# Patient Record
Sex: FEMALE | Race: ASIAN | Employment: STUDENT | URBAN - METROPOLITAN AREA
[De-identification: names, ages, dates, MRNs, and addresses within clinical notes are randomized per-mention and may not be internally consistent; named-entity substitution may affect disease eponyms.]

---
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
## 2025-04-18 NOTE — PROGRESS NOTES
The following individual(s) verbally consented to be recorded using ambient AI listening technology and understand that they can each withdraw their consent to this listening technology at any point by asking the clinician to turn off or pause the recording:    Patient name: Devorah Munoz  Additional names:  luisa multani

## 2025-04-22 NOTE — TELEPHONE ENCOUNTER
Breztri not covered by patient's insurance.  Patient must try and fail 5 other medications including Spiriva in order to obtain a prior authorization.  Patient currently on Symbicort.  Per Dr. Adkins patient to use Spiriva.

## 2025-04-23 NOTE — TELEPHONE ENCOUNTER
Patients insurance will not cover alternative inhaler (Spiriva), she needs another alternative sent to [facility].

## 2025-04-23 NOTE — TELEPHONE ENCOUNTER
Call made to [facility] pharmacy. Per Pharmacist [facility] is not a covered pharmacy.  Script must be sent to a [facility]. Patient notified and Spiriva sent to [facility] on [address].

## 2025-04-23 NOTE — TELEPHONE ENCOUNTER
[facility] on [facility] called, spoke with pharmacy technician,  requested prescription for Spiriva be transferred to [facility] on [facility]. Per tech, patient can called that location and ask to have the prescription pulled and transferred. Patient called to notify. Advised to continue Symbicort as well.

## 2025-04-23 NOTE — TELEPHONE ENCOUNTER
Patient needs medication sent to the [facility] on [address] [place] [address].     She may need someone else to pick this up for her. Is there anything she needs to do?

## 2025-04-28 NOTE — PROGRESS NOTES
[facility] General Pulmonary Consult Note    Chief Complaint:  Chief Complaint   Patient presents with    New Patient     Pt c/o dyspnea on exertion x6 years        History of Present Illness:  History of Present Illness  Devorah Munoz is a 20-year-old female who presents with concerns about asthma and shortness of breath during exercise.    She experiences significant shortness of breath after running even short distances, a persistent issue since her freshman year of high school. She has difficulty breathing during cheerleading activities, which involve running, tumbling, and stunting. She describes a 'gasp for air' and a subtle whistling sound when breathing after exertion.    She has a history of seeing an asthma specialist who prescribed medications, but she did not have a follow-up. She underwent a breathing test and a peak flow test previously, but she is unsure of the results. She has not been on any oral steroids like prednisone.    Currently, she uses Symbicort, which she finds helpful in maintaining her breathing throughout the day, especially when climbing stairs. She uses albuterol only in 'desperate needs' due to concerns about shakiness. Her symptoms are bothersome on a daily basis, including at night.    She has a history of smoking but is trying to quit. Her breathing issues worsen during cold and hot seasons, requiring her to cover her face in winter to ease breathing.      Past Medical History:   Past Medical History[1]     Past Surgical History: Past Surgical History[2]    Family Medical History: Family History[3]     Social History:   Social History     Socioeconomic History    Marital status: Single     Spouse name: Not on file    Number of children: Not on file    Years of education: Not on file    Highest education level: Not on file   Occupational History    Not on file   Tobacco Use    Smoking status: Former     Types: Cigarettes     Passive exposure: Past    Smokeless tobacco: Never    Tobacco  comments:     Social smoker    Vaping Use    Vaping status: Every Day   Substance and Sexual Activity    Alcohol use: Not Currently     Comment: occasion, history of binge drinking    Drug use: Not Currently     Types: Cannabis     Comment: daily    Sexual activity: Not on file   Other Topics Concern    Not on file   Social History Narrative    Not on file     Social Drivers of Health     Food Insecurity: No Food Insecurity (9/26/2023)    Received from [facility]    Food Insecurity     RETIRE How often in the past 12 months would you say you are worried or stressed about having enough money to buy nutritious meals? : Never   Transportation Needs: No Transportation Needs (9/26/2023)    Received from [facility]    PRAPARE - Transportation     In the past 12 months, has lack of transportation kept you from medical appointments or from getting medications?: No     In the past 12 months, has lack of transportation kept you from meetings, work, or from getting things needed for daily living?: No   Stress: Not on file   Housing Stability: At Risk (8/18/2023)    Received from [facility]     Living Situation: Not on file     Housing Problems: Not on file        Allergies: Oxcarbazepine, Seasonal, Lactose, and Wheat gluten     Medications: Current Medications[4]    Review of Systems: Review of Systems    Physical Exam:  /64 (BP Location: Right arm, Patient Position: Sitting, Cuff Size: adult)   Pulse 57   Resp 16   Ht 5' (1.524 m)   Wt 120 lb (54.4 kg)   SpO2 100%   BMI 23.44 kg/m²      Constitutional: alert, cooperative. No acute distress.  HEENT: Head NC/AT. Nares normal. Septum midline. Mucosa normal. No drainage or sinus tenderness.. Mallampati 1+  Cardio: Regular rate and rhythm. Normal S1 and S2. No murmurs.   Respiratory: Thorax symmetrical with no labored breathing. clear to auscultation bilaterally  GI: NABS. Abd soft, non-tender.  Extremities: No clubbing or  cyanosis. No BLE edema.    Neurologic: A&Ox3. No gross motor deficits.  Skin: Warm, dry  Psych: Calm, cooperative. Pleasant affect.    Results:  Images personally reviewed - my own review dictated as below  Results      WBC: 6, done on 4/18/2025.  HGB: 12.7, done on 4/18/2025.  PLT: 363, done on 4/18/2025.     Last eGFR was 80 on 10/17/2023.  CA: 10.1, done on 10/17/2023.  Cl: 108, done on 10/17/2023.  CO2: 24, done on 10/17/2023.     No results found.     Assessment/Plan:  Assessment & Plan  Exercise-induced bronchoconstriction  Symptoms of shortness of breath and wheezing post-exertion suggest exercise-induced bronchoconstriction. Breztri recommended for improved control over Symbicort.  - Prescribe Breztri inhaler, use twice daily.  - Discontinue Symbicort.  - Continue Albuterol as needed for acute symptoms.  - Order pulmonary function test to compare with December 2021 results.  - Order blood work to identify potential triggers and guide treatment.  - Instruct to rinse mouth after Breztri to prevent thrush.    Asthma  Asthma-like symptoms noted, with consideration of different types and triggers. No recent exacerbations requiring oral steroids.  - Evaluate blood work to determine asthma type and potential triggers.  - Monitor response to Breztri inhaler.    Addendum: breztri not covered will try symbicort and incruse    Return in about 6 weeks (around 5/30/2025).    Jhonny Adkins MD  4/28/2025         [1]   Past Medical History:   ADHD (attention deficit hyperactivity disorder)    Asthma (HCC)    Borderline personality disorder (HCC)    Depression    Eating disorder, unspecified    Headache disorder    migraines    Pain    IN BOTH LEGS \"GROWING PAINS\"    Personal history of nonsuicidal self-injury   [2]   Past Surgical History:  Procedure Laterality Date    Remove tonsils/adenoids,<11 y/o     [3]   Family History  Family history unknown: Yes   [4]   Current Outpatient Medications   Medication Sig  Dispense Refill    umeclidinium bromide (INCRUSE ELLIPTA) 62.5 MCG/ACT Inhalation Aerosol Powder, Breath Activated Inhale 1 puff into the lungs daily. 30 each 2    albuterol 108 (90 Base) MCG/ACT Inhalation Aero Soln       B Complex Oral Cap Take 1 capsule by mouth.      SYMBICORT 160-4.5 MCG/ACT Inhalation Aerosol       budeson-glycopyrrol-formoterol (BREZTRI AEROSPHERE) 160-9-4.8 MCG/ACT Inhalation Aerosol Inhale 2 puffs into the lungs 2 (two) times daily. 10.7 g 5    tretinoin 0.025 % External Cream Apply to face every day in the evening      Cariprazine HCl (VRAYLAR) 4.5 MG Oral Cap Take 1 capsule by mouth at bedtime.      cloNIDine 0.2 MG Oral Tab Take 1 tablet (0.2 mg total) by mouth at bedtime.      SPIRIVA RESPIMAT 2.5 MCG/ACT Inhalation Aero Soln Inhale 2 puffs into the lungs daily. 4 g 3

## 2025-05-15 NOTE — PROCEDURES
Findings:  FEV1 is 2.18L, z-score of -1.34.  FVC is 2.81L, z-score of -0.35.  FEV1/ FVC ratio is 0.77.  The flow-volume loop demonstrates a normal pattern.  The TLC is 3.98L, z-score of -0.48.  The residual volume 1.17L, z-score of 0.62.  The diffusion capacity is 13.12 with z-score of -2.80. When corrected for alveolar volume, the diffusion capacity is 3.63 with z-score of -2.06.  Impression:  There is no airway obstruction on spirometry and visualized on flow-volume loop.  Lung volumes are normal.  Diffusion capacity is moderately reduced but improves to mild when considering alveolar volume. The isolated decrease in diffusion capacity can be seen in emphysema, interstitial lung disease, pulmonary vascular disease (such as pulmonary hypertension) and anemia. If not already performed, would suggest further evaluation as determined clinically.  Additionally, given the severity of the reduced diffusion capacity, would recommend evaluation for hypoxia and supplemental oxygenation if not already performed.  When compared to previous spirometry testing dated 12/15/2021, there have been increases in FEV1 (previously 1.86L, 73% predicted) and FVC (previously 2.25L, 82% predicted).  Of note, this testing was performed in accordance to ATS/ERS interpretation guidelines with the use of upper and lower limits of normal as well as z-score references. Previous testing (before March 2024) was not performed at [facility] using z-scores and so comparison to previous testing should be taken with caution.

## 2025-06-20 NOTE — PROGRESS NOTES
The following individual(s) verbally consented to be recorded using ambient AI listening technology and understand that they can each withdraw their consent to this listening technology at any point by asking the clinician to turn off or pause the recording:    Patient name: Devorah Dick Alexander  Additional names:  Inga Munzo

## 2025-06-21 NOTE — PROGRESS NOTES
[facility] Pulmonary Follow Up Note    Chief Complaint:  Chief Complaint   Patient presents with    Follow - Up     Pt is here for f/u. Pt states been using inhaler more, otherwise no c/o.       History of Present Illness:  History of Present Illness  Devorah Munoz is a 20 year old female with asthma who presents with shortness of breath and iron deficiency anemia.    She experiences shortness of breath and a sensation of chest pressure, described as 'hard to breathe' and 'heavy,' without wheezing. Her breathing improves with inhalers, including a new one called spiriva, but she still frequently uses albuterol.  I have been treating her for asthma and uptitrating inhalers, which seems to have helped somewhat.    She has irregular and heavy menstrual periods. Her hemoglobin level is 12.4 g/dL and ferritin level is 29 ng/mL. She takes one iron pill daily but experiences constipation as a side effect. Despite consuming red meat and other iron-rich foods, her iron levels remain low. She also experiences dizziness, possibly related to her iron deficiency.         Past Medical History:   Past Medical History[1]     Past Surgical History:   Past Surgical History[2]    Family Medical History: Family History[3]     Social History:   Social History     Socioeconomic History    Marital status: Single     Spouse name: Not on file    Number of children: Not on file    Years of education: Not on file    Highest education level: Not on file   Occupational History    Not on file   Tobacco Use    Smoking status: Former     Current packs/day: 0.00     Average packs/day: 0.5 packs/day for 6.3 years (3.2 ttl pk-yrs)     Types: Cigarettes     Start date:      Quit date: 2025     Years since quittin.1     Passive exposure: Past    Smokeless tobacco: Never    Tobacco comments:     Social smoker    Vaping Use    Vaping status: Every Day   Substance and Sexual Activity    Alcohol use: Not Currently     Comment: occasion, history of  binge drinking    Drug use: Not Currently     Types: Cannabis     Comment: daily    Sexual activity: Not on file   Other Topics Concern    Not on file   Social History Narrative    Not on file     Social Drivers of Health     Food Insecurity: No Food Insecurity (9/26/2023)    Received from [facility]    Food Insecurity     RETIRE How often in the past 12 months would you say you are worried or stressed about having enough money to buy nutritious meals? : Never   Transportation Needs: No Transportation Needs (9/26/2023)    Received from [facility]    PRAPARE - Transportation     In the past 12 months, has lack of transportation kept you from medical appointments or from getting medications?: No     In the past 12 months, has lack of transportation kept you from meetings, work, or from getting things needed for daily living?: No   Stress: Not on file   Housing Stability: At Risk (8/18/2023)    Received from [facility]     Living Situation: Not on file     Housing Problems: Not on file        Medications: Current Medications[4]    Review of Systems: Review of Systems     Physical Exam:  /68 (BP Location: Right arm, Patient Position: Sitting, Cuff Size: adult)   Pulse 72   Ht 5' (1.524 m)   Wt 120 lb (54.4 kg)   SpO2 100%   BMI 23.44 kg/m²      Constitutional: alert, cooperative. No acute distress.  HEENT: Head NC/AT. Nares normal. Septum midline. Mucosa normal. No drainage or sinus tenderness.  Cardio: Regular rate and rhythm. Normal S1 and S2. No murmurs.   Respiratory: Thorax symmetrical with no labored breathing. clear to auscultation bilaterally  Extremities: No clubbing or cyanosis. No BLE edema.    Neurologic: A&Ox3. No gross motor deficits.  Skin: Warm, dry  Psych: Calm, cooperative. Pleasant affect.    Results:  Images personally reviewed - reading is my own personal review  Results  LABS  Hb: 12.4 g/dL  Ferritin: 29 ng/mL    DIAGNOSTIC  Pulmonary function  test: Mild decrease in diffusing capacity for carbon monoxide (DLCO)       PFTs:       No data to display                   No data to display                    WBC: 6, done on 4/18/2025.  HGB: 12.7, done on 4/18/2025.  PLT: 363, done on 4/18/2025.     Last eGFR was 80 on 10/17/2023.  CA: 10.1, done on 10/17/2023.  Cl: 108, done on 10/17/2023.  CO2: 24, done on 10/17/2023.     No results found.     Assessment/Plan:  Assessment & Plan  Iron deficiency anemia  Iron deficiency anemia likely due to menorrhagia. Hemoglobin 12.4 g/dL, ferritin 29 ng/mL indicate low iron stores. Symptoms include dyspnea and dizziness. IV iron preferred due to previous constipation and inadequate oral iron repletion.  - Refer to hematologist for evaluation and potential IV iron therapy.  - Discuss dietary options to increase iron intake, including red meat and beans.  - Consider oral iron supplementation if dietary changes are insufficient.    Asthma  Asthma diagnosis uncertain. Breathing improved with inhalers, but dyspnea persists, possibly related to anemia. Mild decrease in oxygen transport on pulmonary function test, likely due to anemia.  - Continue current inhaler regimen.  - Reassess asthma symptoms after addressing iron deficiency.  - Consider methacholine challenge test if asthma diagnosis remains uncertain after iron repletion.         No follow-ups on file.    I spent 30 minutes obtaining and reviewing records, preparing for the visit including reviewing chart and prior testing, face to face time examining the patient and obtaining history, counseling, arranging and reviewing office-based testing, independently reviewing relevant studies and documentation exclusive of other billable procedures.      Jhonny Adkins MD  6/20/2025         [1]   Past Medical History:   ADHD (attention deficit hyperactivity disorder)    Asthma (HCC)    Borderline personality disorder (HCC)    Depression    Eating disorder, unspecified     Headache disorder    migraines    Pain    IN BOTH LEGS \"GROWING PAINS\"    Personal history of nonsuicidal self-injury   [2]   Past Surgical History:  Procedure Laterality Date    Remove tonsils/adenoids,<11 y/o     [3]   Family History  Family history unknown: Yes   [4]   Current Outpatient Medications   Medication Sig Dispense Refill    SPIRIVA RESPIMAT 2.5 MCG/ACT Inhalation Aero Soln Inhale 2 puffs into the lungs daily. 4 g 3    Budesonide-Formoterol Fumarate (SYMBICORT) 160-4.5 MCG/ACT Inhalation Aerosol Inhale 2 puffs into the lungs 2 (two) times daily. 10.2 g 5    albuterol 108 (90 Base) MCG/ACT Inhalation Aero Soln       B Complex Oral Cap Take 1 capsule by mouth.      SYMBICORT 160-4.5 MCG/ACT Inhalation Aerosol Inhale 2 puffs into the lungs 2 (two) times daily.      tretinoin 0.025 % External Cream Apply to face every day in the evening      umeclidinium bromide (INCRUSE ELLIPTA) 62.5 MCG/ACT Inhalation Aerosol Powder, Breath Activated Inhale 1 puff into the lungs daily. (Patient not taking: Reported on 6/20/2025) 30 each 2    budeson-glycopyrrol-formoterol (BREZTRI AEROSPHERE) 160-9-4.8 MCG/ACT Inhalation Aerosol Inhale 2 puffs into the lungs 2 (two) times daily. (Patient not taking: Reported on 6/20/2025) 10.7 g 5    Cariprazine HCl (VRAYLAR) 4.5 MG Oral Cap Take 1 capsule by mouth at bedtime. (Patient not taking: Reported on 6/20/2025)      cloNIDine 0.2 MG Oral Tab Take 1 tablet (0.2 mg total) by mouth at bedtime. (Patient not taking: Reported on 6/20/2025)

## 2025-07-07 NOTE — TELEPHONE ENCOUNTER
Patient advised by Dr. Adkins to see a Hematologist. She called to schedule but they will not schedule her unless they receive a referral from our office with her diagnosis (anemia).     [facility] fax number: [phone]

## 2025-07-07 NOTE — TELEPHONE ENCOUNTER
Patient called. Discussed with Cherelle ORR. Agreeable to have hematology referral to [facility]--Dr Ernestina Lynne. Referral electronically routed to Dr. Lynne.  [phone]. Patient notified via [facility] message.

## (undated) NOTE — MR AVS SNAPSHOT
After Visit Summary   10/4/2017    Timmy Valentino    MRN: [id number]           Visit Information     Date & Time  10/4/2017  7:30 AM Provider  [address] [place] [address] EEG Dept.  Phone  [phone]      Allergies as of 10/4/2017  Reviewed o o go on a walking scavenger hunt through the neighborhood   o grow a family garden    In addition to 11, 4, 3, 2, 1 families can make small changes in their family routines to help everyone lead healthier active lives.  Try:  o Eating breakfast everyday  o E few hours.            Treatment for mild   illness or injury that does   not require immediate attention   VIDEO VISITS  Average cost  $35*    e-VISITS  Average cost  $35*      [address]

## (undated) NOTE — IP AVS SNAPSHOT
[address]            (For Outpatient Use Only) Initial Admit Date: 2022   Inpt/Obs Admit Date: Inpt: 22 / Obs: N/A   Discharge Date:    Hospital Acct:  [de-identified]   MRN: [de-identified]   CSN: [id number]   CEID: [id number]        ENCOUNTER  Patient Class: Inpatient Admitting Provider: Nancy Novak DO Unit: [facility] [address]   Hospital Service: Pediatrics Attending Provider: Gabriella David MD   Bed: 186-A   Visit Type:   Referring Physician: No ref. provider found Billing Flag:    Admit Diagnosis: Anxiety [F41.9]      PATIENT  Legal Name:   Alina Diallo    Legal Sex: Female  Gender ID: Other             Pref Name:    PCP:  Xu Borrero MD Home: [phone]   Address:  [place] [address] : 2005 (17 yrs) Mobile: [phone]         City/State/Zip: [facility] [address] Marital: Single Language: [address] [place] [address] Drive: [employer] SSN4: [id number] Christian: Wishes Privacy     Race:  Ethnicity: Non  Or [address] CONTACT   Name Relationship Legal Guardian? Home Phone Work Phone Mobile Phone   1.  Inga Munoz  2. *No Contact Specified* Mother            [phone]       GUARANTOR  Guarantor: Manuel PÉREZ : 10/20/1967 Home Phone: [phone]   Address: [place] [address]  Sex: Female Work Phone:    City/[facility]/Zip: [facility]   Rel. to Patient: Mother Guarantor ID: [id number]   [address]   Employer: [employer] Status: FULL TIME     COVERAGE  PRIMARY INSURANCE   Payor[place] Plan: BCBS PPO   Group Number: [id number] Insurance Type: [address] Name: Kris Max : 10/20/1967   Subscriber ID: [id number] Pt Rel to Subscriber: Child   SECONDARY INSURANCE   Payor: MEDICAID Plan: MEDICAID   Group Number:  Insurance Type: INDEMNITY   Subscriber Name: Shauna Meghan : 2005   Subscriber ID: [id number] Pt Rel to Subscriber: SELF   TERTIARY INSURANCE   Payor:  Plan:    Group Number:  Insurance Type:    Subscriber Name:  Julián Law :    Subscriber ID:  Pt Rel to Subscriber:    Hospital Account Financial Class: Tresa Gitelman [facility]    2022

## (undated) NOTE — ED AVS SNAPSHOT
Timmy Chelsy   MRN: [id number]    Department:  [facility] Emergency Department   Date of Visit:  7/13/2018           Disclosure     Insurance plans vary and the physician(s) referred by the ER may not be covered by your plan.  Please contact your tell this physician (or your personal doctor if your instructions are to return to your personal doctor) about any new or lasting problems. The primary care or specialist physician will see patients referred from the [facility] Emergency Department.  Brook Carpio

## (undated) NOTE — ED AVS SNAPSHOT
Parent/Legal Guardian Access to the Online [employer] Record of a Patient 15to 16Years Old  Return completed form by Secure email to [facility]/Medical Records Department: [email or web address].     Requirements and Procedures   Under [facility] ·  I understand that [address] is intended as a secure online source of confidential medical information.  If I share my MyChart ID and password with another person, that person may be able to view my or my child’s health information, and health information a · This form does not substitute as an Authorization to Release health information to a designated proxy by any other method. The purpose of this Minor Proxy form is for access to the [employer] portal information.     By signing below, I acknowledge that I ha I have read and understand the requirements and procedures for accessing my child’s medical record information online as provided  on page one of this document titled, Parent/Legal Guardian Access to the Online MyChart Record of a Patient 12 to [address]

## (undated) NOTE — ED AVS SNAPSHOT
Parent/Legal Guardian Access to the Online [employer] Record of a Patient 15to 16Years Old  Return completed form by Secure email to [facility] HIM/Medical Records Department: [email or web address].     Requirements and Procedures   Under [facility] ·  I understand that [address] is intended as a secure online source of confidential medical information.  If I share my MyChart ID and password with another person, that person may be able to view my or my child’s health information, and health information a · This form does not substitute as an Authorization to Release health information to a designated proxy by any other method. The purpose of this Minor Proxy form is for access to the [employer] portal information.     By signing below, I acknowledge that I ha I have read and understand the requirements and procedures for accessing my child’s medical record information online as provided  on page one of this document titled, Parent/Legal Guardian Access to the Online MyChart Record of a Patient 12 to [address]

## (undated) NOTE — LETTER
August 25, 2021    Patient: Andres Pollard   Date of Visit: 8/25/2021       To Whom It May Concern:    Andres Pollard was seen and treated in our emergency department on 8/25/2021. She {Return to school/sport/work:[id number]}.     If you have any question

## (undated) NOTE — ED AVS SNAPSHOT
Parent/Legal Guardian Access to the Online [employer] Record of a Patient 15to 16Years Old  Return completed form by Secure email to [facility]: [email or web address]     Requirements and Procedures   Under [facility] ·  I understand that [address] is intended as a secure online source of confidential medical information.  If I share my MyChart ID and password with another person, that person may be able to view my or my child’s health information, and health information a · This form does not substitute as an Authorization to Release health information to a designated proxy by any other method. The purpose of this Minor Proxy form is for access to the [employer] portal information.     By signing below, I acknowledge that I ha I have read and understand the requirements and procedures for accessing my child’s medical record information online as provided  on page one of this document titled, Parent/Legal Guardian Access to the Online MyChart Record of a Patient 12 to [address]